# Patient Record
Sex: MALE | Race: WHITE | NOT HISPANIC OR LATINO
[De-identification: names, ages, dates, MRNs, and addresses within clinical notes are randomized per-mention and may not be internally consistent; named-entity substitution may affect disease eponyms.]

---

## 2017-06-22 PROBLEM — Z00.00 ENCOUNTER FOR PREVENTIVE HEALTH EXAMINATION: Status: ACTIVE | Noted: 2017-06-22

## 2017-06-30 ENCOUNTER — APPOINTMENT (OUTPATIENT)
Dept: CARDIOLOGY | Facility: CLINIC | Age: 52
End: 2017-06-30

## 2017-06-30 ENCOUNTER — NON-APPOINTMENT (OUTPATIENT)
Age: 52
End: 2017-06-30

## 2017-06-30 VITALS
WEIGHT: 187 LBS | DIASTOLIC BLOOD PRESSURE: 82 MMHG | HEART RATE: 68 BPM | RESPIRATION RATE: 16 BRPM | HEIGHT: 71 IN | BODY MASS INDEX: 26.18 KG/M2 | SYSTOLIC BLOOD PRESSURE: 123 MMHG | OXYGEN SATURATION: 99 %

## 2017-06-30 DIAGNOSIS — Z82.49 FAMILY HISTORY OF ISCHEMIC HEART DISEASE AND OTHER DISEASES OF THE CIRCULATORY SYSTEM: ICD-10-CM

## 2017-06-30 DIAGNOSIS — F90.9 ATTENTION-DEFICIT HYPERACTIVITY DISORDER, UNSPECIFIED TYPE: ICD-10-CM

## 2017-06-30 DIAGNOSIS — Z87.891 PERSONAL HISTORY OF NICOTINE DEPENDENCE: ICD-10-CM

## 2017-06-30 DIAGNOSIS — R94.31 ABNORMAL ELECTROCARDIOGRAM [ECG] [EKG]: ICD-10-CM

## 2017-06-30 RX ORDER — LISDEXAMFETAMINE DIMESYLATE 70 MG/1
70 CAPSULE ORAL
Qty: 90 | Refills: 0 | Status: ACTIVE | COMMUNITY
Start: 2017-04-27

## 2017-06-30 RX ORDER — MELOXICAM 15 MG/1
15 TABLET ORAL
Qty: 30 | Refills: 0 | Status: ACTIVE | COMMUNITY
Start: 2017-04-04

## 2017-06-30 RX ORDER — SODIUM SULFATE, POTASSIUM SULFATE, MAGNESIUM SULFATE 17.5; 3.13; 1.6 G/ML; G/ML; G/ML
17.5-3.13-1.6 SOLUTION, CONCENTRATE ORAL
Qty: 354 | Refills: 0 | Status: ACTIVE | COMMUNITY
Start: 2017-06-21

## 2017-08-18 ENCOUNTER — APPOINTMENT (OUTPATIENT)
Dept: CARDIOLOGY | Facility: CLINIC | Age: 52
End: 2017-08-18
Payer: COMMERCIAL

## 2017-08-18 PROCEDURE — 93015 CV STRESS TEST SUPVJ I&R: CPT

## 2017-08-18 PROCEDURE — 93306 TTE W/DOPPLER COMPLETE: CPT

## 2019-03-18 ENCOUNTER — INPATIENT (INPATIENT)
Facility: HOSPITAL | Age: 54
LOS: 3 days | Discharge: HOME | DRG: 558 | End: 2019-03-22
Attending: INTERNAL MEDICINE | Admitting: INTERNAL MEDICINE
Payer: COMMERCIAL

## 2019-03-18 VITALS
TEMPERATURE: 98 F | HEART RATE: 70 BPM | SYSTOLIC BLOOD PRESSURE: 126 MMHG | OXYGEN SATURATION: 100 % | WEIGHT: 188.05 LBS | DIASTOLIC BLOOD PRESSURE: 78 MMHG | RESPIRATION RATE: 16 BRPM

## 2019-03-18 DIAGNOSIS — M71.10 OTHER INFECTIVE BURSITIS, UNSPECIFIED SITE: ICD-10-CM

## 2019-03-18 LAB
ALBUMIN SERPL ELPH-MCNC: 4 G/DL — SIGNIFICANT CHANGE UP (ref 3.3–5)
ALP SERPL-CCNC: 67 U/L — SIGNIFICANT CHANGE UP (ref 40–120)
ALT FLD-CCNC: 17 U/L — SIGNIFICANT CHANGE UP (ref 10–45)
ANION GAP SERPL CALC-SCNC: 10 MMOL/L — SIGNIFICANT CHANGE UP (ref 5–17)
AST SERPL-CCNC: 21 U/L — SIGNIFICANT CHANGE UP (ref 10–40)
BASOPHILS # BLD AUTO: 0 K/UL — SIGNIFICANT CHANGE UP (ref 0–0.2)
BASOPHILS NFR BLD AUTO: 0.6 % — SIGNIFICANT CHANGE UP (ref 0–2)
BILIRUB SERPL-MCNC: 0.3 MG/DL — SIGNIFICANT CHANGE UP (ref 0.2–1.2)
BUN SERPL-MCNC: 15 MG/DL — SIGNIFICANT CHANGE UP (ref 7–23)
CALCIUM SERPL-MCNC: 9.9 MG/DL — SIGNIFICANT CHANGE UP (ref 8.4–10.5)
CHLORIDE SERPL-SCNC: 100 MMOL/L — SIGNIFICANT CHANGE UP (ref 96–108)
CO2 SERPL-SCNC: 28 MMOL/L — SIGNIFICANT CHANGE UP (ref 22–31)
CREAT SERPL-MCNC: 1.01 MG/DL — SIGNIFICANT CHANGE UP (ref 0.5–1.3)
CRP SERPL-MCNC: 1 MG/DL — HIGH (ref 0–0.4)
EOSINOPHIL # BLD AUTO: 0.3 K/UL — SIGNIFICANT CHANGE UP (ref 0–0.5)
EOSINOPHIL NFR BLD AUTO: 5.3 % — SIGNIFICANT CHANGE UP (ref 0–6)
ERYTHROCYTE [SEDIMENTATION RATE] IN BLOOD: 27 MM/HR — HIGH (ref 0–20)
GAS PNL BLDV: SIGNIFICANT CHANGE UP
GLUCOSE SERPL-MCNC: 99 MG/DL — SIGNIFICANT CHANGE UP (ref 70–99)
HCT VFR BLD CALC: 43.5 % — SIGNIFICANT CHANGE UP (ref 39–50)
HGB BLD-MCNC: 14.4 G/DL — SIGNIFICANT CHANGE UP (ref 13–17)
LYMPHOCYTES # BLD AUTO: 1.1 K/UL — SIGNIFICANT CHANGE UP (ref 1–3.3)
LYMPHOCYTES # BLD AUTO: 22.3 % — SIGNIFICANT CHANGE UP (ref 13–44)
MCHC RBC-ENTMCNC: 30.2 PG — SIGNIFICANT CHANGE UP (ref 27–34)
MCHC RBC-ENTMCNC: 33.2 GM/DL — SIGNIFICANT CHANGE UP (ref 32–36)
MCV RBC AUTO: 91.1 FL — SIGNIFICANT CHANGE UP (ref 80–100)
MONOCYTES # BLD AUTO: 0.5 K/UL — SIGNIFICANT CHANGE UP (ref 0–0.9)
MONOCYTES NFR BLD AUTO: 9.6 % — SIGNIFICANT CHANGE UP (ref 2–14)
NEUTROPHILS # BLD AUTO: 3.1 K/UL — SIGNIFICANT CHANGE UP (ref 1.8–7.4)
NEUTROPHILS NFR BLD AUTO: 62.2 % — SIGNIFICANT CHANGE UP (ref 43–77)
PLATELET # BLD AUTO: 192 K/UL — SIGNIFICANT CHANGE UP (ref 150–400)
POTASSIUM SERPL-MCNC: 5.2 MMOL/L — SIGNIFICANT CHANGE UP (ref 3.5–5.3)
POTASSIUM SERPL-SCNC: 5.2 MMOL/L — SIGNIFICANT CHANGE UP (ref 3.5–5.3)
PROT SERPL-MCNC: 7.4 G/DL — SIGNIFICANT CHANGE UP (ref 6–8.3)
RBC # BLD: 4.77 M/UL — SIGNIFICANT CHANGE UP (ref 4.2–5.8)
RBC # FLD: 11.7 % — SIGNIFICANT CHANGE UP (ref 10.3–14.5)
SODIUM SERPL-SCNC: 138 MMOL/L — SIGNIFICANT CHANGE UP (ref 135–145)
WBC # BLD: 5 K/UL — SIGNIFICANT CHANGE UP (ref 3.8–10.5)
WBC # FLD AUTO: 5 K/UL — SIGNIFICANT CHANGE UP (ref 3.8–10.5)

## 2019-03-18 PROCEDURE — 73562 X-RAY EXAM OF KNEE 3: CPT | Mod: 26,RT

## 2019-03-18 PROCEDURE — 99285 EMERGENCY DEPT VISIT HI MDM: CPT

## 2019-03-18 PROCEDURE — 73564 X-RAY EXAM KNEE 4 OR MORE: CPT | Mod: 26,RT

## 2019-03-18 PROCEDURE — 99254 IP/OBS CNSLTJ NEW/EST MOD 60: CPT | Mod: GC

## 2019-03-18 RX ORDER — VANCOMYCIN HCL 1 G
1250 VIAL (EA) INTRAVENOUS EVERY 12 HOURS
Qty: 0 | Refills: 0 | Status: DISCONTINUED | OUTPATIENT
Start: 2019-03-18 | End: 2019-03-20

## 2019-03-18 RX ORDER — KETOROLAC TROMETHAMINE 30 MG/ML
15 SYRINGE (ML) INJECTION ONCE
Qty: 0 | Refills: 0 | Status: DISCONTINUED | OUTPATIENT
Start: 2019-03-18 | End: 2019-03-18

## 2019-03-18 RX ORDER — LISDEXAMFETAMINE DIMESYLATE 70 MG/1
1 CAPSULE ORAL
Qty: 0 | Refills: 0 | COMMUNITY

## 2019-03-18 RX ORDER — SODIUM CHLORIDE 9 MG/ML
1000 INJECTION INTRAMUSCULAR; INTRAVENOUS; SUBCUTANEOUS ONCE
Qty: 0 | Refills: 0 | Status: COMPLETED | OUTPATIENT
Start: 2019-03-18 | End: 2019-03-18

## 2019-03-18 RX ADMIN — Medication 166.67 MILLIGRAM(S): at 18:56

## 2019-03-18 RX ADMIN — Medication 600 MILLIGRAM(S): at 14:00

## 2019-03-18 RX ADMIN — Medication 100 MILLIGRAM(S): at 13:23

## 2019-03-18 RX ADMIN — SODIUM CHLORIDE 2000 MILLILITER(S): 9 INJECTION INTRAMUSCULAR; INTRAVENOUS; SUBCUTANEOUS at 12:14

## 2019-03-18 RX ADMIN — Medication 15 MILLIGRAM(S): at 12:13

## 2019-03-18 NOTE — ED PROVIDER NOTE - ATTENDING CONTRIBUTION TO CARE
I performed a history and physical exam of the patient and discussed their management with the resident. I reviewed the resident's note and agree with the documented findings and plan of care.  Noni Merrill MD

## 2019-03-18 NOTE — CONSULT NOTE ADULT - ASSESSMENT
55 yo M no PMHx p/w R knee pain/swelling.  Found to have MRSA in the knee in the setting of known recurrent bursitis.      PENDING ATTENDING ATTESTATION    - 3/12 the patient returned to [facility] where a R knee bursal aspiration grew MRSA. He was started on bactrim and returned for follow up earlier today. The knee swelling was noted to have worsened  - c/w abx  - f/u blood cx, esr/crp  - Xray knee pending 55 yo M no PMHx p/w R knee pain/swelling.  Found to have MRSA in the knee in the setting of known recurrent bursitis.      PENDING ATTENDING ATTESTATION    - Pt reports pain/erythema started in November, due to worsening he got 1 week of abx in December with drainage and then had improvement however never complete resolution.  In January he got a second course of abx with improvement but no resolution again.  Then on 3/12 the patient returned to [facility] where a R knee bursal aspiration grew MRSA. He was started on bactrim and returned for follow up where he was told to go to the ER  - Xray knee consistent with prepatellar bursitis  - c/w abx  - f/u blood cx, esr/crp  - f/u with [facility] to get culture results to ensure that there is no other growth and to get sensitivities 53 yo M no PMHx p/w R knee pain/swelling.  Found to have MRSA in the knee in the setting of known recurrent bursitis.      PENDING ATTENDING ATTESTATION    - Pt reports pain/erythema started in November, due to worsening he got 1 week of abx in December with drainage and then had improvement however never complete resolution.  In January he got a second course of abx with improvement but no resolution again.  Then on 3/12 the patient returned to [facility] where a R knee bursal aspiration grew MRSA. He was started on bactrim and returned for follow up where he was told to go to the ER  - Xray knee consistent with prepatellar bursitis  - c/w vanco, per outpt Ortho the MRSA was sensitive to bactrim which the pt was on for a week and also sensitive to vanco  - f/u blood cx, esr/crp 53 yo M no PMHx p/w R knee pain/swelling.  Found to have MRSA in the knee in the setting of known recurrent bursitis.      PENDING ATTENDING ATTESTATION    - Pt reports pain/erythema started in November, due to worsening he got 1 week of abx in December with drainage and then had improvement however never complete resolution.  In January he got a second course of abx with improvement but no resolution again.  Then on 3/12 the patient returned to [facility] where a R knee bursal aspiration grew MRSA. He was started on bactrim and returned for follow up where he was told to go to the ER  - Xray knee consistent with prepatellar bursitis  - c/w vanco based on MRSA sensitivities  - f/u blood cx, esr/crp

## 2019-03-18 NOTE — H&P ADULT - EXTREMITIES COMMENTS
right knee with swelling anterior to the pattella with erythema and warmth.  no joint swelling. FROM

## 2019-03-18 NOTE — ED PROVIDER NOTE - NS ED ROS FT
GENERAL: No fever, +chills, EYES: no change in vision, HEENT: no trouble swallowing or speaking, CARDIAC: no chest pain, PULMONARY: no cough or SOB, GI: no abdominal pain, no nausea, no vomiting, no diarrhea or constipation, : No changes in urination, SKIN: no rashes, NEURO: no headache,  MSK: R knee pain ~Lynette Swain M.D. Resident

## 2019-03-18 NOTE — ED PROVIDER NOTE - PHYSICAL EXAMINATION
Gen: AAOx3, non-toxic  Head: NCAT  HEENT: EOMI, oral mucosa moist, normal conjunctiva  Lung: CTAB, no respiratory distress, no wheezes/rhonchi/rales B/L, speaking in full sentences  CV: RRR, no murmurs, rubs or gallops  Abd: soft, NTND, no guarding  MSK: R knee with erythema and swelling overlying patella, full knee ROM, pt ambulating with antalgic gait  Neuro: No focal sensory or motor deficits  Skin: Warm, well perfused, no rash, +erythema overlying R patella  Psych: normal affect.   ~Lynette Swain M.D. Resident

## 2019-03-18 NOTE — CONSULT NOTE ADULT - SUBJECTIVE AND OBJECTIVE BOX
Consultation Requested by:    Patient is a 54y old  Male who presents with a chief complaint of right knee pain (18 Mar 2019 14:25)    HPI:  55 yo M no PMHx p/w R knee pain/swelling. Pt initially had a bursitis drained from the right knee in early December. It became infected and he was started on Keflex. The knee continued to be red and swollen and on 3/12 it was drained again and grew MRSA. He was started on Bactrim last Tuesday and today followed up with Dr. Gaytan (orthopedics) and was told to go to the ER. He has been ambulating but not been bearing much weight on the right leg. He has had some chills yesterday, no fevers, N/V, abd pain. He had an MRI on 2/6. (18 Mar 2019 14:25)      REVIEW OF SYSTEMS      Allergies  No Known Allergies      FAMILY HISTORY:  No pertinent family history in first degree relatives    PAST MEDICAL & SURGICAL HISTORY:  No pertinent past medical history  No significant past surgical history    SOCIAL HISTORY:        Vital Signs Last 24 Hrs  T(C): 36.8 (18 Mar 2019 11:03), Max: 36.8 (18 Mar 2019 11:03)  T(F): 98.2 (18 Mar 2019 11:03), Max: 98.2 (18 Mar 2019 11:03)  HR: 70 (18 Mar 2019 11:03) (70 - 70)  BP: 126/78 (18 Mar 2019 11:03) (126/78 - 126/78)  BP(mean): --  RR: 16 (18 Mar 2019 11:03) (16 - 16)  SpO2: 100% (18 Mar 2019 11:03) (100% - 100%)    PHYSICAL EXAM  CONSTITUTIONAL: well appearing, well developed, no apparent distress  HEAD: Head atraumatic, normal cephalic shape.  EYES: sclera clear bilaterally, EOMI  CARDIAC: RRR, normal S1/S2, no murmurs  RESPIRATORY: no respiratory distress, normal breath sounds  CHEST: no erythema, warmth, tenderness or crepitus  GASTROINTESTINAL: soft, nontender, bowel sounds present  MUSCULOSKELETAL: +erythema/warmth of knee  +swelling of prepatellar area  +pain on direct palpation  NEUROLOGICAL: AAOx3, no focal deficits, full strength   SKIN: normal skin color, no apparent rashes  PSYCH: normal mood/affect    Lab Results:                        14.4   5.0   )-----------( 192      ( 18 Mar 2019 12:16 )             43.5     03-18    138  |  100  |  15  ----------------------------<  99  5.2   |  28  |  1.01    Ca    9.9      18 Mar 2019 12:16    TPro  7.4  /  Alb  4.0  /  TBili  0.3  /  DBili  x   /  AST  21  /  ALT  17  /  AlkPhos  67  03-18    LIVER FUNCTIONS - ( 18 Mar 2019 12:16 )  Alb: 4.0 g/dL / Pro: 7.4 g/dL / ALK PHOS: 67 U/L / ALT: 17 U/L / AST: 21 U/L / GGT: x Patient is a 54y old  Male who presents with a chief complaint of right knee pain (18 Mar 2019 14:25)    HPI:  55 yo M no PMHx p/w R knee pain/swelling. Pt initially had a bursitis drained from the right knee in early December. It became infected and he was started on Keflex. The knee continued to be red and swollen and on 3/12 it was drained again and grew MRSA. He was started on Bactrim last Tuesday and today followed up with Dr. Gaytan (orthopedics) and was told to go to the ER. He has been ambulating but not been bearing much weight on the right leg. He has had some chills yesterday, no fevers, N/V, abd pain. He had an MRI on 2/6. (18 Mar 2019 14:25)      REVIEW OF SYSTEMS  CONSTITUTIONAL: No weakness, fevers or chills  HEENT: No visual changes; No vertigo or throat pain   NECK: No pain or stiffness  RESPIRATORY: No cough, wheezing, hemoptysis; No shortness of breath  CARDIOVASCULAR: No chest pain or palpitations  GASTROINTESTINAL: no abdominal discomfort, No nausea, vomiting, or hematemesis; No diarrhea or constipation. No melena or hematochezia.  GENITOURINARY: No dysuria, frequency or hematuria  NEUROLOGICAL: No numbness or weakness  MSK: R knee tenderness  SKIN: No itching, no rash      Allergies  No Known Allergies      FAMILY HISTORY:  No pertinent family history in first degree relatives    PAST MEDICAL & SURGICAL HISTORY:  No pertinent past medical history  No significant past surgical history        Vital Signs Last 24 Hrs  T(C): 36.8 (18 Mar 2019 11:03), Max: 36.8 (18 Mar 2019 11:03)  T(F): 98.2 (18 Mar 2019 11:03), Max: 98.2 (18 Mar 2019 11:03)  HR: 70 (18 Mar 2019 11:03) (70 - 70)  BP: 126/78 (18 Mar 2019 11:03) (126/78 - 126/78)  BP(mean): --  RR: 16 (18 Mar 2019 11:03) (16 - 16)  SpO2: 100% (18 Mar 2019 11:03) (100% - 100%)    PHYSICAL EXAM  CONSTITUTIONAL: well appearing, well developed, no apparent distress  HEAD: Head atraumatic, normal cephalic shape.  EYES: sclera clear bilaterally, EOMI  CARDIAC: RRR, normal S1/S2, no murmurs  RESPIRATORY: no respiratory distress, normal breath sounds  CHEST: no erythema, warmth, tenderness or crepitus  GASTROINTESTINAL: soft, nontender, bowel sounds present  MUSCULOSKELETAL: +erythema/warmth of knee, +swelling of prepatellar area, +pain on direct palpation, +limited ROM in setting of pain  NEUROLOGICAL: AAOx3, no focal deficits, full strength   SKIN: normal skin color, no apparent rashes  PSYCH: normal mood/affect    Lab Results:                        14.4   5.0   )-----------( 192      ( 18 Mar 2019 12:16 )             43.5     03-18    138  |  100  |  15  ----------------------------<  99  5.2   |  28  |  1.01    Ca    9.9      18 Mar 2019 12:16    TPro  7.4  /  Alb  4.0  /  TBili  0.3  /  DBili  x   /  AST  21  /  ALT  17  /  AlkPhos  67  03-18    LIVER FUNCTIONS - ( 18 Mar 2019 12:16 )  Alb: 4.0 g/dL / Pro: 7.4 g/dL / ALK PHOS: 67 U/L / ALT: 17 U/L / AST: 21 U/L / GGT: x Patient is a 54y old  Male who presents with a chief complaint of right knee pain (18 Mar 2019 14:25)    HPI:  53 yo M no PMHx p/w R knee pain/swelling. Pt initially had a bursitis drained from the right knee in early December. It became infected and he was started on Keflex. The knee continued to be red and swollen and on 3/12 it was drained again and grew MRSA. He was started on Bactrim last Tuesday and today followed up with Dr. Gaytan (orthopedics) and was told to go to the ER. He has been ambulating but not been bearing much weight on the right leg. He has had some chills yesterday, no fevers, N/V, abd pain. He had an MRI on 2/6. (18 Mar 2019 14:25)      REVIEW OF SYSTEMS  CONSTITUTIONAL: No weakness, fevers or chills  HEENT: No visual changes; No vertigo or throat pain   NECK: No pain or stiffness  RESPIRATORY: No cough, wheezing, hemoptysis; No shortness of breath  CARDIOVASCULAR: No chest pain or palpitations  GASTROINTESTINAL: no abdominal discomfort, No nausea, vomiting, or hematemesis; No diarrhea or constipation. No melena or hematochezia.  GENITOURINARY: No dysuria, frequency or hematuria  NEUROLOGICAL: No numbness or weakness  MSK: R knee tenderness  SKIN: No itching, no rash      Allergies  No Known Allergies      FAMILY HISTORY:  No pertinent family history in first degree relatives    PAST MEDICAL & SURGICAL HISTORY:  No pertinent past medical history  No significant past surgical history        Vital Signs Last 24 Hrs  T(C): 36.8 (18 Mar 2019 11:03), Max: 36.8 (18 Mar 2019 11:03)  T(F): 98.2 (18 Mar 2019 11:03), Max: 98.2 (18 Mar 2019 11:03)  HR: 70 (18 Mar 2019 11:03) (70 - 70)  BP: 126/78 (18 Mar 2019 11:03) (126/78 - 126/78)  BP(mean): --  RR: 16 (18 Mar 2019 11:03) (16 - 16)  SpO2: 100% (18 Mar 2019 11:03) (100% - 100%)    PHYSICAL EXAM  CONSTITUTIONAL: well appearing, well developed, no apparent distress  HEAD: Head atraumatic, normal cephalic shape.  EYES: sclera clear bilaterally, EOMI  CARDIAC: RRR, normal S1/S2, no murmurs  RESPIRATORY: no respiratory distress, normal breath sounds  CHEST: no erythema, warmth, tenderness or crepitus  GASTROINTESTINAL: soft, nontender, bowel sounds present  MUSCULOSKELETAL: +erythema/warmth of knee, +swelling of prepatellar area, +pain on direct palpation, +limited ROM in setting of pain  NEUROLOGICAL: AAOx3, no focal deficits, full strength   SKIN: normal skin color, no apparent rashes  PSYCH: normal mood/affect    Lab Results:                        14.4   5.0   )-----------( 192      ( 18 Mar 2019 12:16 )             43.5     03-18    138  |  100  |  15  ----------------------------<  99  5.2   |  28  |  1.01    Ca    9.9      18 Mar 2019 12:16    TPro  7.4  /  Alb  4.0  /  TBili  0.3  /  DBili  x   /  AST  21  /  ALT  17  /  AlkPhos  67  03-18    LIVER FUNCTIONS - ( 18 Mar 2019 12:16 )  Alb: 4.0 g/dL / Pro: 7.4 g/dL / ALK PHOS: 67 U/L / ALT: 17 U/L / AST: 21 U/L / GGT: x             < from: Xray Knee 4 Views, Right (03.18.19 @ 12:23) >  Anterior soft tissue swelling, consistent with prepatellar bursitis.    < end of copied text >

## 2019-03-18 NOTE — ED PROVIDER NOTE - CLINICAL SUMMARY MEDICAL DECISION MAKING FREE TEXT BOX
55 yo M no PMHx p/w R knee pain/swelling s/p arthrocentesis x 2 with growth of MRSA on 3/12, likely septic bursitis as pt with full knee ROM and minimal pain, will d/w ortho, check labs, Xray, pain meds, IV abx, treat/dispo accordingly

## 2019-03-18 NOTE — CONSULT NOTE ADULT - ASSESSMENT
A/P: 54yoM with R knee septic bursitis with MRSA + cultures    Neuro: Pain control  Resp: IS  GI: Regular diet, bowel reg  MSK: WBAT, PT/OT  Heme: DVT PPX  ID: IV abx per sensitivities from prior culture  FU ESR/CRP  FU Blood cultures  No acute ortho intervention at this time  Will continue to follow  Discussed with attending     x1333 A/P: 54yoM with R knee septic bursitis with MRSA + R knee bursa cultures    Neuro: Pain control  Resp: IS  GI: Regular diet, bowel reg  MSK: WBAT, PT/OT  Heme: DVT PPX  ID: IV abx per sensitivities from prior culture  FU ESR/CRP  FU Blood cultures  No acute ortho intervention at this time  Will continue to follow  Discussed with attending     x1337 A/P: 54yoM with R knee prepatellar bursitis with MRSA + R knee bursa cultures    Neuro: Pain control  Resp: IS  GI: Regular diet, bowel reg  MSK: WBAT, PT/OT  Heme: DVT PPX  ID: IV abx per sensitivities from prior culture  FU ESR/CRP  FU Blood cultures  No acute ortho intervention at this time  Will continue to follow  Discussed with attending     [id number]

## 2019-03-18 NOTE — CONSULT NOTE ADULT - SUBJECTIVE AND OBJECTIVE BOX
54yMale c/o R knee pain/redness/swelling that started 12/18. The Patient went to [facility] where his knee was aspirated and a steroid was injected. The swelling initially went down but then began to worsen until it involved most of his lower leg. The patient was started on keflex 1/19 and the redness/swelling began to improve. However, he stopped taking it a few days early and the symptoms returned. Last week, 3/12 the patient returned to [facility] where a knee aspiration grew MRSA. He was started on bactrim and returned for follow up earlier today. The knee swelling was noted to have worsened so he was told to come to the ED for IV abx. The patient denies hx of trauma. Patient denies fever but endorses some chills.     ROS: 10 point review of systems otherwise negative unless noted in HPI      PMHx: none    PSH: b/l carpal tunnel 15yrs ago    AH: none    Meds: See med rec    T(C): 36.8 (03-18-19 @ 11:03)  HR: 70 (03-18-19 @ 11:03)  BP: 126/78 (03-18-19 @ 11:03)  RR: 16 (03-18-19 @ 11:03)  SpO2: 100% (03-18-19 @ 11:03)  Wt(kg): --    Gen: NAD  Resp: Unlabored breathing  PE RLE:  Skin intact   +erythema/warmth of knee  +swelling of prepatellar area  +pain on direct palpation  SILT DP/SP/Duy/Saph  +EHL/FHL/TA/Gastroc,   Knee extension to 0  Knee flexion to 130, mildly painful  Able to bear weight   DP+/PT + pulse  soft compartments  No calf ttp.      Imaging:  XR of R knee demonstrating no acute fracture or dislocation                          14.4   5.0   )-----------( 192      ( 18 Mar 2019 12:16 )             43.5   03-18    138  |  100  |  15  ----------------------------<  99  5.2   |  28  |  1.01    Ca    9.9      18 Mar 2019 12:16    TPro  7.4  /  Alb  4.0  /  TBili  0.3  /  DBili  x   /  AST  21  /  ALT  17  /  AlkPhos  67  03-18 54yMale c/o R knee pain/redness/swelling that started 12/18. The Patient went to [facility] where his knee was aspirated and a steroid was injected. The swelling initially went down but then began to worsen until it involved most of his lower leg. The patient was started on keflex 1/19 and the redness/swelling began to improve. However, he stopped taking it a few days early and the symptoms returned. Last week, 3/12 the patient returned to [facility] where a R knee bursal aspiration grew MRSA. He was started on bactrim and returned for follow up earlier today. The knee swelling was noted to have worsened so he was told to come to the ED for IV abx. The patient denies hx of trauma. Patient denies fever but endorses some chills.     ROS: 10 point review of systems otherwise negative unless noted in HPI      PMHx: none    PSH: b/l carpal tunnel 15yrs ago    AH: none    Meds: See med rec    T(C): 36.8 (03-18-19 @ 11:03)  HR: 70 (03-18-19 @ 11:03)  BP: 126/78 (03-18-19 @ 11:03)  RR: 16 (03-18-19 @ 11:03)  SpO2: 100% (03-18-19 @ 11:03)  Wt(kg): --    Gen: NAD  Resp: Unlabored breathing  PE RLE:  Skin intact   +erythema/warmth of knee  +swelling of prepatellar area  +pain on direct palpation  SILT DP/SP/Duy/Saph  +EHL/FHL/TA/Gastroc,   Knee extension to 0  Knee flexion to 130, mildly painful  Able to bear weight   DP+/PT + pulse  soft compartments  No calf ttp.      Imaging:  XR of R knee demonstrating no acute fracture or dislocation                          14.4   5.0   )-----------( 192      ( 18 Mar 2019 12:16 )             43.5   03-18    138  |  100  |  15  ----------------------------<  99  5.2   |  28  |  1.01    Ca    9.9      18 Mar 2019 12:16    TPro  7.4  /  Alb  4.0  /  TBili  0.3  /  DBili  x   /  AST  21  /  ALT  17  /  AlkPhos  67  03-18

## 2019-03-18 NOTE — H&P ADULT - ASSESSMENT
55 yo M no PMHx p/w R knee pain/swelling. Pt initially had a bursitis drained from the right knee in early December. It became infected and he was started on Keflex. The knee continued to be red and swollen and on 3/12 it was drained again and grew MRSA. He was started on Bactrim last Tuesday and today followed up with Dr. Gaytan (orthopedics) and was told to go to the ER. He has been ambulating but not been bearing much weight on the right leg. He has had some chills yesterday, no fevers, N/V, abd pain. He had an MRI on 2/6.     R knee prepatellar bursitis with MRSA + R knee bursa cultures    Neuro: Pain control  Resp: IS  GI: Regular diet, bowel reg  MSK: WBAT, PT/OT  Heme: DVT PPX  ID consult: IV abx per sensitivities from prior culture  FU ESR/CRP  FU Blood cultures  seen by ortho  No acute ortho intervention at this time

## 2019-03-18 NOTE — H&P ADULT - HISTORY OF PRESENT ILLNESS
53 yo M no PMHx p/w R knee pain/swelling. Pt initially had a bursitis drained from the right knee in early December. It became infected and he was started on Keflex. The knee continued to be red and swollen and on 3/12 it was drained again and grew MRSA. He was started on Bactrim last Tuesday and today followed up with Dr. Gaytan (orthopedics) and was told to go to the ER. He has been ambulating but not been bearing much weight on the right leg. He has had some chills yesterday, no fevers, N/V, abd pain. He had an MRI on 2/6.

## 2019-03-18 NOTE — CONSULT NOTE ADULT - ATTENDING COMMENTS
Continue IV Abx, will continue follow, if condition worsens, will plan for I+D however at this point, no surgical intervention indicated.
54M with MRSA bursitis with recent abx use  -no fever  -knee joint does not look involved  -vancomycin 1.25 gm iv q12  -f/u blood cx  -ortho input noted  -if not improving, consider I+D   -not septic    Rafat Ghotra  Attending Physician   Division of Infectious Disease  Pager #[phone]  After 5pm/weekend or no response, call #[phone]

## 2019-03-18 NOTE — ED ADULT NURSE NOTE - OBJECTIVE STATEMENT
55 y/o male with PMH ADD presenting to ED for right knee reddness & pain. Pt states "I have bursitis and have been having my knee drained but it's still swollen and red. I had an MRI last month and a sonogram this past tuesday. The redness was down my calf but has been getting better. I'm currently on antibiotics for it. " Upon exam pt A&Ox3 gross neuro intact, lungs cta bilaterally, no difficulty speaking in complete sentences, s1s2 heart sounds heard,  abdomen soft nontender nondistended, skin intact, right knee red, swollen, tender to touch, +ROM in bilateral lower extremities, 1+ pitting edema to right lower extremity, afebrile in ED, Pt denies chest pain, sob, ha, n/v/d, abdominal pain, f/c, urinary symptoms, hematuria

## 2019-03-19 DIAGNOSIS — A49.02 METHICILLIN RESISTANT STAPHYLOCOCCUS AUREUS INFECTION, UNSPECIFIED SITE: ICD-10-CM

## 2019-03-19 DIAGNOSIS — M71.10 OTHER INFECTIVE BURSITIS, UNSPECIFIED SITE: ICD-10-CM

## 2019-03-19 PROCEDURE — 99232 SBSQ HOSP IP/OBS MODERATE 35: CPT

## 2019-03-19 RX ORDER — ACETAMINOPHEN 500 MG
650 TABLET ORAL EVERY 6 HOURS
Qty: 0 | Refills: 0 | Status: DISCONTINUED | OUTPATIENT
Start: 2019-03-19 | End: 2019-03-22

## 2019-03-19 RX ORDER — KETOROLAC TROMETHAMINE 30 MG/ML
15 SYRINGE (ML) INJECTION ONCE
Qty: 0 | Refills: 0 | Status: DISCONTINUED | OUTPATIENT
Start: 2019-03-19 | End: 2019-03-19

## 2019-03-19 RX ADMIN — Medication 15 MILLIGRAM(S): at 03:17

## 2019-03-19 RX ADMIN — Medication 166.67 MILLIGRAM(S): at 18:14

## 2019-03-19 RX ADMIN — Medication 650 MILLIGRAM(S): at 18:21

## 2019-03-19 RX ADMIN — Medication 15 MILLIGRAM(S): at 03:47

## 2019-03-19 RX ADMIN — Medication 166.67 MILLIGRAM(S): at 05:04

## 2019-03-19 NOTE — PROGRESS NOTE ADULT - SUBJECTIVE AND OBJECTIVE BOX
SCHUYLER YEE 54y MRN-[id number]    Patient is a 54y old  Male who presents with a chief complaint of right knee pain (18 Mar 2019 14:39)      Follow Up/CC:  ID following for bursitis    Interval History/ROS: still with knee pain, spontaneous drainage today with pus    Allergies    No Known Allergies    Intolerances        ANTIMICROBIALS:  vancomycin  IVPB 1250 every 12 hours      MEDICATIONS  (STANDING):  vancomycin  IVPB 1250 milliGRAM(s) IV Intermittent every 12 hours    MEDICATIONS  (PRN):  acetaminophen   Tablet .. 650 milliGRAM(s) Oral every 6 hours PRN Moderate Pain (4 - 6)        Vital Signs Last 24 Hrs  T(C): 37.1 (19 Mar 2019 17:54), Max: 37.3 (18 Mar 2019 22:00)  T(F): 98.8 (19 Mar 2019 17:54), Max: 99.1 (18 Mar 2019 22:00)  HR: 68 (19 Mar 2019 17:54) (61 - 69)  BP: 127/76 (19 Mar 2019 17:54) (113/70 - 127/76)  BP(mean): --  RR: 18 (19 Mar 2019 17:54) (17 - 18)  SpO2: 98% (19 Mar 2019 17:54) (96% - 98%)    CBC Full  -  ( 18 Mar 2019 12:16 )  WBC Count : 5.0 K/uL  Hemoglobin : 14.4 g/dL  Hematocrit : 43.5 %  Platelet Count - Automated : 192 K/uL  Mean Cell Volume : 91.1 fl  Mean Cell Hemoglobin : 30.2 pg  Mean Cell Hemoglobin Concentration : 33.2 gm/dL  Auto Neutrophil # : 3.1 K/uL  Auto Lymphocyte # : 1.1 K/uL  Auto Monocyte # : 0.5 K/uL  Auto Eosinophil # : 0.3 K/uL  Auto Basophil # : 0.0 K/uL  Auto Neutrophil % : 62.2 %  Auto Lymphocyte % : 22.3 %  Auto Monocyte % : 9.6 %  Auto Eosinophil % : 5.3 %  Auto Basophil % : 0.6 %    03-18    138  |  100  |  15  ----------------------------<  99  5.2   |  28  |  1.01    Ca    9.9      18 Mar 2019 12:16    TPro  7.4  /  Alb  4.0  /  TBili  0.3  /  DBili  x   /  AST  21  /  ALT  17  /  AlkPhos  67  03-18    LIVER FUNCTIONS - ( 18 Mar 2019 12:16 )  Alb: 4.0 g/dL / Pro: 7.4 g/dL / ALK PHOS: 67 U/L / ALT: 17 U/L / AST: 21 U/L / GGT: x               MICROBIOLOGY:    RADIOLOGY    Xray Knee 4 Views, Right (03.18.19 @ 12:23) >  Anterior soft tissue swelling, consistent with prepatellar bursitis.

## 2019-03-19 NOTE — PROGRESS NOTE ADULT - NSICDXPROBLEM_GEN_ALL_CORE_FT
PROBLEM DIAGNOSES  Problem: Septic bursitis  Assessment and Plan: -ortho reeval given pus drainage- ?need for further I+D    Problem: MRSA infection  Assessment and Plan: -cont vanomcyin  -wound cx sent off drainage

## 2019-03-19 NOTE — CHART NOTE - NSCHARTNOTEFT_GEN_A_CORE
Attending Dr. Spear saw patient today. Continue to allow bursa to drain and decompress naturally    No acute ortho intervention  Please page back as needed if you have questions    Ortho [id number]

## 2019-03-19 NOTE — PROGRESS NOTE ADULT - SUBJECTIVE AND OBJECTIVE BOX
Patient is a 54y old  Male who presents with a chief complaint of right knee pain (19 Mar 2019 12:16)      SUBJECTIVE / OVERNIGHT EVENTS:  oozing from right knee    MEDICATIONS  (STANDING):  vancomycin  IVPB 1250 milliGRAM(s) IV Intermittent every 12 hours    MEDICATIONS  (PRN):  acetaminophen   Tablet .. 650 milliGRAM(s) Oral every 6 hours PRN Moderate Pain (4 - 6)      Vital Signs Last 24 Hrs  T(C): 37.1 (19 Mar 2019 17:54), Max: 37.3 (18 Mar 2019 22:00)  T(F): 98.8 (19 Mar 2019 17:54), Max: 99.1 (18 Mar 2019 22:00)  HR: 68 (19 Mar 2019 17:54) (61 - 69)  BP: 127/76 (19 Mar 2019 17:54) (113/70 - 127/76)  BP(mean): --  RR: 18 (19 Mar 2019 17:54) (17 - 18)  SpO2: 98% (19 Mar 2019 17:54) (96% - 98%)  CAPILLARY BLOOD GLUCOSE        I&O's Summary    18 Mar 2019 07:01  -  19 Mar 2019 07:00  --------------------------------------------------------  IN: 240 mL / OUT: 0 mL / NET: 240 mL    19 Mar 2019 07:01  -  19 Mar 2019 20:03  --------------------------------------------------------  IN: 480 mL / OUT: 250 mL / NET: 230 mL        PHYSICAL EXAM:  GENERAL: NAD, well-developed  HEAD:  Atraumatic, Normocephalic  EYES: EOMI, PERRLA, conjunctiva and sclera clear  NECK: Supple, No JVD  CHEST/LUNG: Clear to auscultation bilaterally; No wheeze  HEART: Regular rate and rhythm; No murmurs, rubs, or gallops  ABDOMEN: Soft, Nontender, Nondistended; Bowel sounds present  EXTREMITIES:  2+ Peripheral Pulses, No clubbing, cyanosis, or edema, oozing pus from knee.  erythema with swelling.  PSYCH: AAOx3  NEUROLOGY: non-focal  SKIN: No rashes or lesions    LABS:                        14.4   5.0   )-----------( 192      ( 18 Mar 2019 12:16 )             43.5     03-18    138  |  100  |  15  ----------------------------<  99  5.2   |  28  |  1.01    Ca    9.9      18 Mar 2019 12:16    TPro  7.4  /  Alb  4.0  /  TBili  0.3  /  DBili  x   /  AST  21  /  ALT  17  /  AlkPhos  67  03-18              RADIOLOGY & ADDITIONAL TESTS:    Imaging Personally Reviewed:    Consultant(s) Notes Reviewed:      Care Discussed with Consultants/Other Providers: Patient is a 54y old  Male who presents with a chief complaint of right knee pain (19 Mar 2019 12:16)      SUBJECTIVE / OVERNIGHT EVENTS:  oozing pus from right knee    MEDICATIONS  (STANDING):  vancomycin  IVPB 1250 milliGRAM(s) IV Intermittent every 12 hours    MEDICATIONS  (PRN):  acetaminophen   Tablet .. 650 milliGRAM(s) Oral every 6 hours PRN Moderate Pain (4 - 6)      Vital Signs Last 24 Hrs  T(C): 37.1 (19 Mar 2019 17:54), Max: 37.3 (18 Mar 2019 22:00)  T(F): 98.8 (19 Mar 2019 17:54), Max: 99.1 (18 Mar 2019 22:00)  HR: 68 (19 Mar 2019 17:54) (61 - 69)  BP: 127/76 (19 Mar 2019 17:54) (113/70 - 127/76)  BP(mean): --  RR: 18 (19 Mar 2019 17:54) (17 - 18)  SpO2: 98% (19 Mar 2019 17:54) (96% - 98%)  CAPILLARY BLOOD GLUCOSE        I&O's Summary    18 Mar 2019 07:01  -  19 Mar 2019 07:00  --------------------------------------------------------  IN: 240 mL / OUT: 0 mL / NET: 240 mL    19 Mar 2019 07:01  -  19 Mar 2019 20:03  --------------------------------------------------------  IN: 480 mL / OUT: 250 mL / NET: 230 mL        PHYSICAL EXAM:  GENERAL: NAD, well-developed  HEAD:  Atraumatic, Normocephalic  EYES: EOMI, PERRLA, conjunctiva and sclera clear  NECK: Supple, No JVD  CHEST/LUNG: Clear to auscultation bilaterally; No wheeze  HEART: Regular rate and rhythm; No murmurs, rubs, or gallops  ABDOMEN: Soft, Nontender, Nondistended; Bowel sounds present  EXTREMITIES:  2+ Peripheral Pulses, No clubbing, cyanosis, or edema, oozing pus from knee.  erythema with swelling.  PSYCH: AAOx3  NEUROLOGY: non-focal  SKIN: No rashes or lesions    LABS:                        14.4   5.0   )-----------( 192      ( 18 Mar 2019 12:16 )             43.5     03-18    138  |  100  |  15  ----------------------------<  99  5.2   |  28  |  1.01    Ca    9.9      18 Mar 2019 12:16    TPro  7.4  /  Alb  4.0  /  TBili  0.3  /  DBili  x   /  AST  21  /  ALT  17  /  AlkPhos  67  03-18              RADIOLOGY & ADDITIONAL TESTS:    Imaging Personally Reviewed:    Consultant(s) Notes Reviewed:      Care Discussed with Consultants/Other Providers:

## 2019-03-20 DIAGNOSIS — Z51.81 ENCOUNTER FOR THERAPEUTIC DRUG LEVEL MONITORING: ICD-10-CM

## 2019-03-20 LAB — VANCOMYCIN TROUGH SERPL-MCNC: 9.6 UG/ML — LOW (ref 10–20)

## 2019-03-20 PROCEDURE — 99232 SBSQ HOSP IP/OBS MODERATE 35: CPT

## 2019-03-20 RX ORDER — VANCOMYCIN HCL 1 G
1250 VIAL (EA) INTRAVENOUS EVERY 8 HOURS
Qty: 0 | Refills: 0 | Status: DISCONTINUED | OUTPATIENT
Start: 2019-03-20 | End: 2019-03-22

## 2019-03-20 RX ORDER — VANCOMYCIN HCL 1 G
1500 VIAL (EA) INTRAVENOUS ONCE
Qty: 0 | Refills: 0 | Status: DISCONTINUED | OUTPATIENT
Start: 2019-03-20 | End: 2019-03-20

## 2019-03-20 RX ORDER — ENOXAPARIN SODIUM 100 MG/ML
40 INJECTION SUBCUTANEOUS DAILY
Qty: 0 | Refills: 0 | Status: DISCONTINUED | OUTPATIENT
Start: 2019-03-20 | End: 2019-03-22

## 2019-03-20 RX ADMIN — Medication 166.67 MILLIGRAM(S): at 21:47

## 2019-03-20 RX ADMIN — ENOXAPARIN SODIUM 40 MILLIGRAM(S): 100 INJECTION SUBCUTANEOUS at 21:47

## 2019-03-20 RX ADMIN — Medication 166.67 MILLIGRAM(S): at 06:37

## 2019-03-20 RX ADMIN — Medication 650 MILLIGRAM(S): at 05:52

## 2019-03-20 RX ADMIN — Medication 166.67 MILLIGRAM(S): at 13:16

## 2019-03-20 RX ADMIN — Medication 650 MILLIGRAM(S): at 05:22

## 2019-03-20 NOTE — PROGRESS NOTE ADULT - SUBJECTIVE AND OBJECTIVE BOX
Patient is a 54y old  Male who presents with a chief complaint of right knee pain (20 Mar 2019 09:47)      SUBJECTIVE / OVERNIGHT EVENTS:  No chest pain. No shortness of breath. No complaints. No events overnight.     MEDICATIONS  (STANDING):  vancomycin  IVPB 1250 milliGRAM(s) IV Intermittent every 8 hours    MEDICATIONS  (PRN):  acetaminophen   Tablet .. 650 milliGRAM(s) Oral every 6 hours PRN Moderate Pain (4 - 6)      Vital Signs Last 24 Hrs  T(C): 36.8 (20 Mar 2019 10:50), Max: 37.1 (19 Mar 2019 17:54)  T(F): 98.3 (20 Mar 2019 10:50), Max: 98.8 (19 Mar 2019 17:54)  HR: 61 (20 Mar 2019 10:50) (61 - 68)  BP: 114/72 (20 Mar 2019 10:50) (114/72 - 127/76)  BP(mean): --  RR: 18 (20 Mar 2019 10:50) (18 - 18)  SpO2: 98% (20 Mar 2019 10:50) (96% - 98%)  CAPILLARY BLOOD GLUCOSE        I&O's Summary    19 Mar 2019 07:01  -  20 Mar 2019 07:00  --------------------------------------------------------  IN: 1100 mL / OUT: 250 mL / NET: 850 mL    20 Mar 2019 07:01  -  20 Mar 2019 16:23  --------------------------------------------------------  IN: 370 mL / OUT: 0 mL / NET: 370 mL        PHYSICAL EXAM:  GENERAL: NAD, well-developed  HEAD:  Atraumatic, Normocephalic  EYES: EOMI, PERRLA, conjunctiva and sclera clear  NECK: Supple, No JVD  CHEST/LUNG: Clear to auscultation bilaterally; No wheeze  HEART: Regular rate and rhythm; No murmurs, rubs, or gallops  ABDOMEN: Soft, Nontender, Nondistended; Bowel sounds present  EXTREMITIES:  2+ Peripheral Pulses, No clubbing, cyanosis, or edema, improved swelling  PSYCH: AAOx3  NEUROLOGY: non-focal  SKIN: No rashes or lesions    LABS:                    RADIOLOGY & ADDITIONAL TESTS:    Imaging Personally Reviewed:    Consultant(s) Notes Reviewed:      Care Discussed with Consultants/Other Providers:

## 2019-03-20 NOTE — PROGRESS NOTE ADULT - SUBJECTIVE AND OBJECTIVE BOX
SCHUYLER YEE 54y [id number]    Patient is a 54y old  Male who presents with a chief complaint of right knee pain (19 Mar 2019 20:03)      Follow Up/CC:  ID following for bursitis    Interval History/ROS: knee feels better, still draining, no fever    Allergies    No Known Allergies    Intolerances        ANTIMICROBIALS:  vancomycin  IVPB 1250 every 8 hours      MEDICATIONS  (STANDING):  vancomycin  IVPB 1250 milliGRAM(s) IV Intermittent every 8 hours    MEDICATIONS  (PRN):  acetaminophen   Tablet .. 650 milliGRAM(s) Oral every 6 hours PRN Moderate Pain (4 - 6)        Vital Signs Last 24 Hrs  T(C): 36.7 (20 Mar 2019 05:20), Max: 37.1 (19 Mar 2019 17:54)  T(F): 98 (20 Mar 2019 05:20), Max: 98.8 (19 Mar 2019 17:54)  HR: 62 (20 Mar 2019 05:20) (62 - 68)  BP: 118/74 (20 Mar 2019 05:20) (116/71 - 127/76)  BP(mean): --  RR: 18 (20 Mar 2019 05:20) (18 - 18)  SpO2: 97% (20 Mar 2019 05:20) (96% - 98%)    CBC Full  -  ( 18 Mar 2019 12:16 )  WBC Count : 5.0 K/uL  Hemoglobin : 14.4 g/dL  Hematocrit : 43.5 %  Platelet Count - Automated : 192 K/uL  Mean Cell Volume : 91.1 fl  Mean Cell Hemoglobin : 30.2 pg  Mean Cell Hemoglobin Concentration : 33.2 gm/dL  Auto Neutrophil # : 3.1 K/uL  Auto Lymphocyte # : 1.1 K/uL  Auto Monocyte # : 0.5 K/uL  Auto Eosinophil # : 0.3 K/uL  Auto Basophil # : 0.0 K/uL  Auto Neutrophil % : 62.2 %  Auto Lymphocyte % : 22.3 %  Auto Monocyte % : 9.6 %  Auto Eosinophil % : 5.3 %  Auto Basophil % : 0.6 %    03-18    138  |  100  |  15  ----------------------------<  99  5.2   |  28  |  1.01    Ca    9.9      18 Mar 2019 12:16    TPro  7.4  /  Alb  4.0  /  TBili  0.3  /  DBili  x   /  AST  21  /  ALT  17  /  AlkPhos  67  03-18    LIVER FUNCTIONS - ( 18 Mar 2019 12:16 )  Alb: 4.0 g/dL / Pro: 7.4 g/dL / ALK PHOS: 67 U/L / ALT: 17 U/L / AST: 21 U/L / GGT: x               MICROBIOLOGY:  .Blood Blood-Venous  03-18-19   No growth to date.  --  --      Vancomycin Level, Trough: 9.6 ug/mL (03-20-19 @ 04:56)    RADIOLOGY    < from: Xray Knee 4 Views, Right (03.18.19 @ 12:23) >  Anterior soft tissue swelling, consistent with prepatellar bursitis.    < end of copied text >

## 2019-03-21 LAB
-  AMPICILLIN/SULBACTAM: SIGNIFICANT CHANGE UP
-  CEFAZOLIN: SIGNIFICANT CHANGE UP
-  CLINDAMYCIN: SIGNIFICANT CHANGE UP
-  DAPTOMYCIN: SIGNIFICANT CHANGE UP
-  ERYTHROMYCIN: SIGNIFICANT CHANGE UP
-  GENTAMICIN: SIGNIFICANT CHANGE UP
-  LINEZOLID: SIGNIFICANT CHANGE UP
-  OXACILLIN: SIGNIFICANT CHANGE UP
-  PENICILLIN: SIGNIFICANT CHANGE UP
-  RIFAMPIN: SIGNIFICANT CHANGE UP
-  TETRACYCLINE: SIGNIFICANT CHANGE UP
-  TRIMETHOPRIM/SULFAMETHOXAZOLE: SIGNIFICANT CHANGE UP
-  VANCOMYCIN: SIGNIFICANT CHANGE UP
METHOD TYPE: SIGNIFICANT CHANGE UP
VANCOMYCIN TROUGH SERPL-MCNC: 18.1 UG/ML — SIGNIFICANT CHANGE UP (ref 10–20)

## 2019-03-21 PROCEDURE — 99232 SBSQ HOSP IP/OBS MODERATE 35: CPT

## 2019-03-21 RX ADMIN — Medication 650 MILLIGRAM(S): at 11:52

## 2019-03-21 RX ADMIN — Medication 650 MILLIGRAM(S): at 21:29

## 2019-03-21 RX ADMIN — Medication 650 MILLIGRAM(S): at 21:59

## 2019-03-21 RX ADMIN — Medication 166.67 MILLIGRAM(S): at 06:03

## 2019-03-21 RX ADMIN — ENOXAPARIN SODIUM 40 MILLIGRAM(S): 100 INJECTION SUBCUTANEOUS at 21:29

## 2019-03-21 RX ADMIN — Medication 166.67 MILLIGRAM(S): at 21:29

## 2019-03-21 RX ADMIN — Medication 1 DROP(S): at 21:29

## 2019-03-21 RX ADMIN — Medication 650 MILLIGRAM(S): at 07:32

## 2019-03-21 RX ADMIN — Medication 166.67 MILLIGRAM(S): at 13:35

## 2019-03-21 NOTE — PROGRESS NOTE ADULT - SUBJECTIVE AND OBJECTIVE BOX
Patient is a 54y old  Male who presents with a chief complaint of right knee pain (21 Mar 2019 13:27)      SUBJECTIVE / OVERNIGHT EVENTS:  doing better.  draining serous fluid    MEDICATIONS  (STANDING):  enoxaparin Injectable 40 milliGRAM(s) SubCutaneous daily  vancomycin  IVPB 1250 milliGRAM(s) IV Intermittent every 8 hours    MEDICATIONS  (PRN):  acetaminophen   Tablet .. 650 milliGRAM(s) Oral every 6 hours PRN Moderate Pain (4 - 6)      Vital Signs Last 24 Hrs  T(C): 36.2 (21 Mar 2019 11:47), Max: 37.3 (20 Mar 2019 21:03)  T(F): 97.2 (21 Mar 2019 11:47), Max: 99.1 (20 Mar 2019 21:03)  HR: 60 (21 Mar 2019 11:47) (60 - 72)  BP: 108/60 (21 Mar 2019 11:47) (108/60 - 127/79)  BP(mean): --  RR: 18 (21 Mar 2019 11:47) (18 - 18)  SpO2: 95% (21 Mar 2019 11:47) (95% - 98%)  CAPILLARY BLOOD GLUCOSE        I&O's Summary    20 Mar 2019 07:01  -  21 Mar 2019 07:00  --------------------------------------------------------  IN: 1590 mL / OUT: 500 mL / NET: 1090 mL    21 Mar 2019 07:01  -  21 Mar 2019 15:45  --------------------------------------------------------  IN: 970 mL / OUT: 0 mL / NET: 970 mL        PHYSICAL EXAM:  GENERAL: NAD, well-developed  HEAD:  Atraumatic, Normocephalic  EYES: EOMI, PERRLA, conjunctiva and sclera clear  NECK: Supple, No JVD  CHEST/LUNG: Clear to auscultation bilaterally; No wheeze  HEART: Regular rate and rhythm; No murmurs, rubs, or gallops  ABDOMEN: Soft, Nontender, Nondistended; Bowel sounds present  EXTREMITIES:  2+ Peripheral Pulses, No clubbing, cyanosis, or edema  PSYCH: AAOx3  NEUROLOGY: non-focal  SKIN: No rashes or lesions    LABS:                    RADIOLOGY & ADDITIONAL TESTS:    Imaging Personally Reviewed:    Consultant(s) Notes Reviewed:      Care Discussed with Consultants/Other Providers:

## 2019-03-21 NOTE — PROGRESS NOTE ADULT - SUBJECTIVE AND OBJECTIVE BOX
SCHUYLER YEE 54y [id number]    Patient is a 54y old  Male who presents with a chief complaint of right knee pain (20 Mar 2019 16:23)      Follow Up/CC:  ID following for bursitis    Interval History/ROS: no fever, feels better    Allergies    No Known Allergies    Intolerances        ANTIMICROBIALS:  vancomycin  IVPB 1250 every 8 hours      MEDICATIONS  (STANDING):  enoxaparin Injectable 40 milliGRAM(s) SubCutaneous daily  vancomycin  IVPB 1250 milliGRAM(s) IV Intermittent every 8 hours    MEDICATIONS  (PRN):  acetaminophen   Tablet .. 650 milliGRAM(s) Oral every 6 hours PRN Moderate Pain (4 - 6)        Vital Signs Last 24 Hrs  T(C): 36.2 (21 Mar 2019 11:47), Max: 37.3 (20 Mar 2019 21:03)  T(F): 97.2 (21 Mar 2019 11:47), Max: 99.1 (20 Mar 2019 21:03)  HR: 60 (21 Mar 2019 11:47) (60 - 72)  BP: 108/60 (21 Mar 2019 11:47) (108/60 - 127/79)  BP(mean): --  RR: 18 (21 Mar 2019 11:47) (18 - 18)  SpO2: 95% (21 Mar 2019 11:47) (95% - 98%)            MICROBIOLOGY:  .Abscess Knee - Right  03-19-19   Few Staphylococcus aureus  --  --      .Blood Blood-Venous  03-18-19   No growth to date.  --  --    Vancomycin Level, Trough: 18.1 ug/mL (03-21-19 @ 05:07)      RADIOLOGY    Xray Knee 4 Views, Right (03.18.19 @ 12:23) >  Anterior soft tissue swelling, consistent with prepatellar bursitis.

## 2019-03-22 ENCOUNTER — TRANSCRIPTION ENCOUNTER (OUTPATIENT)
Age: 54
End: 2019-03-22

## 2019-03-22 VITALS
DIASTOLIC BLOOD PRESSURE: 76 MMHG | HEART RATE: 62 BPM | TEMPERATURE: 99 F | OXYGEN SATURATION: 97 % | RESPIRATION RATE: 18 BRPM | SYSTOLIC BLOOD PRESSURE: 114 MMHG

## 2019-03-22 DIAGNOSIS — K59.00 CONSTIPATION, UNSPECIFIED: ICD-10-CM

## 2019-03-22 LAB
HCT VFR BLD CALC: 42.8 % — SIGNIFICANT CHANGE UP (ref 39–50)
HGB BLD-MCNC: 14.3 G/DL — SIGNIFICANT CHANGE UP (ref 13–17)
MCHC RBC-ENTMCNC: 30.5 PG — SIGNIFICANT CHANGE UP (ref 27–34)
MCHC RBC-ENTMCNC: 33.5 GM/DL — SIGNIFICANT CHANGE UP (ref 32–36)
MCV RBC AUTO: 91.1 FL — SIGNIFICANT CHANGE UP (ref 80–100)
PLATELET # BLD AUTO: 220 K/UL — SIGNIFICANT CHANGE UP (ref 150–400)
RBC # BLD: 4.69 M/UL — SIGNIFICANT CHANGE UP (ref 4.2–5.8)
RBC # FLD: 11.5 % — SIGNIFICANT CHANGE UP (ref 10.3–14.5)
VANCOMYCIN TROUGH SERPL-MCNC: 19.1 UG/ML — SIGNIFICANT CHANGE UP (ref 10–20)
WBC # BLD: 6.3 K/UL — SIGNIFICANT CHANGE UP (ref 3.8–10.5)
WBC # FLD AUTO: 6.3 K/UL — SIGNIFICANT CHANGE UP (ref 3.8–10.5)

## 2019-03-22 PROCEDURE — 82803 BLOOD GASES ANY COMBINATION: CPT

## 2019-03-22 PROCEDURE — 85014 HEMATOCRIT: CPT

## 2019-03-22 PROCEDURE — 73564 X-RAY EXAM KNEE 4 OR MORE: CPT

## 2019-03-22 PROCEDURE — 82435 ASSAY OF BLOOD CHLORIDE: CPT

## 2019-03-22 PROCEDURE — 86140 C-REACTIVE PROTEIN: CPT

## 2019-03-22 PROCEDURE — 80202 ASSAY OF VANCOMYCIN: CPT

## 2019-03-22 PROCEDURE — 87205 SMEAR GRAM STAIN: CPT

## 2019-03-22 PROCEDURE — 85027 COMPLETE CBC AUTOMATED: CPT

## 2019-03-22 PROCEDURE — 87040 BLOOD CULTURE FOR BACTERIA: CPT

## 2019-03-22 PROCEDURE — 87186 SC STD MICRODIL/AGAR DIL: CPT

## 2019-03-22 PROCEDURE — 85652 RBC SED RATE AUTOMATED: CPT

## 2019-03-22 PROCEDURE — 96375 TX/PRO/DX INJ NEW DRUG ADDON: CPT

## 2019-03-22 PROCEDURE — 84132 ASSAY OF SERUM POTASSIUM: CPT

## 2019-03-22 PROCEDURE — 96374 THER/PROPH/DIAG INJ IV PUSH: CPT

## 2019-03-22 PROCEDURE — 82330 ASSAY OF CALCIUM: CPT

## 2019-03-22 PROCEDURE — 99285 EMERGENCY DEPT VISIT HI MDM: CPT | Mod: 25

## 2019-03-22 PROCEDURE — 82947 ASSAY GLUCOSE BLOOD QUANT: CPT

## 2019-03-22 PROCEDURE — 84295 ASSAY OF SERUM SODIUM: CPT

## 2019-03-22 PROCEDURE — 87075 CULTR BACTERIA EXCEPT BLOOD: CPT

## 2019-03-22 PROCEDURE — 80053 COMPREHEN METABOLIC PANEL: CPT

## 2019-03-22 PROCEDURE — 73562 X-RAY EXAM OF KNEE 3: CPT

## 2019-03-22 PROCEDURE — 87070 CULTURE OTHR SPECIMN AEROBIC: CPT

## 2019-03-22 PROCEDURE — 99232 SBSQ HOSP IP/OBS MODERATE 35: CPT

## 2019-03-22 PROCEDURE — 83605 ASSAY OF LACTIC ACID: CPT

## 2019-03-22 RX ORDER — DOCUSATE SODIUM 100 MG
1 CAPSULE ORAL
Qty: 0 | Refills: 0 | COMMUNITY

## 2019-03-22 RX ORDER — ACETAMINOPHEN 500 MG
2 TABLET ORAL
Qty: 0 | Refills: 0 | COMMUNITY
Start: 2019-03-22

## 2019-03-22 RX ADMIN — Medication 166.67 MILLIGRAM(S): at 05:41

## 2019-03-22 RX ADMIN — Medication 166.67 MILLIGRAM(S): at 13:53

## 2019-03-22 RX ADMIN — Medication 1 DROP(S): at 05:41

## 2019-03-22 NOTE — PROGRESS NOTE ADULT - MS GEN HX ROS MEA POS PC
right knee pain
right knee pain better and draining fluid

## 2019-03-22 NOTE — PROGRESS NOTE ADULT - SUBJECTIVE AND OBJECTIVE BOX
Patient seen and examined at bedside. Reports no acute complaints at this time. Pain is well controlled. No acute events overnight.    PHYSICAL EXAM:  Vital Signs Last 24 Hrs  T(C): 36.7 (22 Mar 2019 05:33), Max: 36.9 (21 Mar 2019 20:02)  T(F): 98.1 (22 Mar 2019 05:33), Max: 98.5 (21 Mar 2019 20:02)  HR: 61 (22 Mar 2019 05:33) (60 - 71)  BP: 118/76 (22 Mar 2019 05:33) (108/60 - 138/77)  BP(mean): --  RR: 17 (22 Mar 2019 05:33) (17 - 18)  SpO2: 97% (22 Mar 2019 05:33) (95% - 99%)    Gen: NAD, AAOx3    Right Lower Extremity:  Dressing clean dry intact  +EHL/FHL/TA/GS  SILT L3-S1  +DP/PT Pulses  Compartments soft  No calf TTP B/L

## 2019-03-22 NOTE — PROGRESS NOTE ADULT - SUBJECTIVE AND OBJECTIVE BOX
SCHUYLER YEE 54y [id number]    Patient is a 54y old  Male who presents with a chief complaint of right knee pain (22 Mar 2019 13:06)      Follow Up/CC:  ID following for bursitis    Interval History/ROS: no fever, better    Allergies    No Known Allergies    Intolerances        ANTIMICROBIALS:  vancomycin  IVPB 1250 every 8 hours      MEDICATIONS  (STANDING):  artificial  tears Solution 1 Drop(s) Both EYES three times a day  enoxaparin Injectable 40 milliGRAM(s) SubCutaneous daily  vancomycin  IVPB 1250 milliGRAM(s) IV Intermittent every 8 hours    MEDICATIONS  (PRN):  acetaminophen   Tablet .. 650 milliGRAM(s) Oral every 6 hours PRN Moderate Pain (4 - 6)        Vital Signs Last 24 Hrs  T(C): 37.3 (22 Mar 2019 12:40), Max: 37.3 (22 Mar 2019 12:40)  T(F): 99.2 (22 Mar 2019 12:40), Max: 99.2 (22 Mar 2019 12:40)  HR: 62 (22 Mar 2019 12:40) (61 - 71)  BP: 114/76 (22 Mar 2019 12:40) (114/76 - 138/77)  BP(mean): --  RR: 18 (22 Mar 2019 12:40) (17 - 18)  SpO2: 97% (22 Mar 2019 12:40) (97% - 99%)    CBC Full  -  ( 22 Mar 2019 12:55 )  WBC Count : 6.3 K/uL  Hemoglobin : 14.3 g/dL  Hematocrit : 42.8 %  Platelet Count - Automated : 220 K/uL  Mean Cell Volume : 91.1 fl  Mean Cell Hemoglobin : 30.5 pg  Mean Cell Hemoglobin Concentration : 33.5 gm/dL  Auto Neutrophil # : x  Auto Lymphocyte # : x  Auto Monocyte # : x  Auto Eosinophil # : x  Auto Basophil # : x  Auto Neutrophil % : x  Auto Lymphocyte % : x  Auto Monocyte % : x  Auto Eosinophil % : x  Auto Basophil % : x                MICROBIOLOGY:  .Abscess Knee - Right  03-19-19   Few Methicillin resistant Staphylococcus aureus  --  Methicillin resistant Staphylococcus aureus      .Blood Blood-Venous  03-18-19   No growth to date.  --  --      Vancomycin Level, Trough: 19.1 ug/mL (03-22-19 @ 05:00)      RADIOLOGY    < from: Xray Knee 4 Views, Right (03.18.19 @ 12:23) >  Anterior soft tissue swelling, consistent with prepatellar bursitis.    < end of copied text >

## 2019-03-22 NOTE — DISCHARGE NOTE PROVIDER - NSDCCPCAREPLAN_GEN_ALL_CORE_FT
PRINCIPAL DISCHARGE DIAGNOSIS  Diagnosis: Septic bursitis  Assessment and Plan of Treatment: Follow up with your Orthopedic doctor in 1 week  Take all antibiotics as ordered.  Call you Health care provider upon arrival home to make a one week follow up appointment.  If you develop fever, chills, malaise, or change in mental status call your Health Care Provider or go to the Emergency Department.  Nutrition is important, eat small frequent meals to help ensure you get adequate calories.  Do not stay in bed all day!  Increase your activity daily as tolerated. PRINCIPAL DISCHARGE DIAGNOSIS  Diagnosis: Septic bursitis  Assessment and Plan of Treatment: Follow up with your Orthopedic doctor in 1 week  Follow up with Dr. Ghotra in 1 weej  Take all antibiotics as ordered.  Call you Health care provider upon arrival home to make a one week follow up appointment.  If you develop fever, chills, malaise, or change in mental status call your Health Care Provider or go to the Emergency Department.  Nutrition is important, eat small frequent meals to help ensure you get adequate calories.  Do not stay in bed all day!  Increase your activity daily as tolerated. PRINCIPAL DISCHARGE DIAGNOSIS  Diagnosis: Septic bursitis  Assessment and Plan of Treatment: Follow up with your Orthopedic doctor in 1 week  Follow up with Dr. Ghotra in 1 weej  Take all antibiotics as ordered.  Call you Health care provider upon arrival home to make a one week follow up appointment.  If you develop fever, chills, malaise, or change in mental status call your Health Care Provider or go to the Emergency Department.  Nutrition is important, eat small frequent meals to help ensure you get adequate calories.  Do not stay in bed all day!  Increase your activity daily as tolerated.      SECONDARY DISCHARGE DIAGNOSES  Diagnosis: Constipation  Assessment and Plan of Treatment: Take colace as needed  Monitor for any bleeding

## 2019-03-22 NOTE — PROGRESS NOTE ADULT - REASON FOR ADMISSION
right knee pain

## 2019-03-22 NOTE — PROGRESS NOTE ADULT - NEGATIVE ENMT SYMPTOMS
no ear pain/no nasal discharge/no throat pain
no nasal discharge/no throat pain/no ear pain
no nasal discharge/no throat pain/no ear pain
no throat pain/no nasal discharge/no ear pain

## 2019-03-22 NOTE — PROGRESS NOTE ADULT - NEUROLOGICAL DETAILS
responds to verbal commands/alert and oriented x 3/normal strength
responds to verbal commands/alert and oriented x 3/normal strength
alert and oriented x 3/responds to verbal commands/normal strength

## 2019-03-22 NOTE — PROGRESS NOTE ADULT - PROBLEM SELECTOR PLAN 1
-cont vancomycin  -increase vanco 1.25 gm iv q8
-cont vanco 1.25 gm iv q8  -plan 4 weeks abx - PO on DC  -better
-cont vancomycin  -dc with doxycyline 100 mg po bid x 3 weeks  -potential side effects of abx explained including GI issues, allergy issues, pill esophagitis and photosensitivity  -f/u in ID office in 3 weeks [phone]

## 2019-03-22 NOTE — PROGRESS NOTE ADULT - NEGATIVE CARDIOVASCULAR SYMPTOMS
no chest pain/no palpitations
no chest pain/no palpitations
no palpitations/no chest pain
no chest pain/no palpitations

## 2019-03-22 NOTE — PROGRESS NOTE ADULT - PROBLEM SELECTOR PLAN 3
-monitor creatinine  -monitor vanco trough - last one was low
-monitor creatinine
-monitor creatinine  -monitor vanco trough - last one was low

## 2019-03-22 NOTE — DISCHARGE NOTE PROVIDER - CARE PROVIDER_API CALL
Dr. Gaytan,   Orthopedic  Phone: (   )    -  Fax: (   )    -  Follow Up Time: Dr. Gaytan,   Orthopedic  Phone: (   )    -  Fax: (   )    -  Follow Up Time:     Rafat Ghotra (MD; MBBS)  Infectious Disease; Internal Medicine  [address] [place] [address]  Phone: [phone]  Fax: [phone]  Follow Up Time:

## 2019-03-22 NOTE — PROGRESS NOTE ADULT - NEGATIVE GASTROINTESTINAL SYMPTOMS
no diarrhea/no vomiting/no nausea/no abdominal pain
no vomiting/no abdominal pain/no nausea/no diarrhea
no vomiting/no diarrhea/no abdominal pain/no nausea
no vomiting/no diarrhea/no abdominal pain/no nausea

## 2019-03-22 NOTE — DISCHARGE NOTE PROVIDER - HOSPITAL COURSE
55 yo M no PMHx p/w R knee pain/swelling. Pt initially had a bursitis drained from the right knee in early December. It became infected and he was started on Keflex. The knee continued to be red and swollen and on 3/12 it was drained again and grew MRSA. He was started on Bactrim last Tuesday and today followed up with Dr. Gaytan (orthopedics) and was told to go to the ER. He has been ambulating but not been bearing much weight on the right leg. He has had some chills yesterday, no fevers, N/V, abd pain. Xray knee consistent with prepatellar bursitis. Ortho consulted-No acute ortho intervention at this time. ID consulted. Started Vancomycin IV for recurrent bursitis. Outpatient with Ortho. Pt to finishe 4 week total of abx. Outpatient f/u with PMD. 53 yo M no PMHx p/w R knee pain/swelling. Pt initially had a bursitis drained from the right knee in early December. It became infected and he was started on Keflex. The knee continued to be red and swollen and on 3/12 it was drained again and grew MRSA. He was started on Bactrim last Tuesday and today followed up with Dr. Gaytan (orthopedics) and was told to go to the ER. He has been ambulating but not been bearing much weight on the right leg. He has had some chills yesterday, no fevers, N/V, abd pain. Xray knee consistent with prepatellar bursitis. Ortho consulted-No acute ortho intervention at this time. ID consulted. Started Vancomycin IV for recurrent bursitis. Outpatient with Ortho. Pt to finishe 4 week total of abx. Outpatient f/u with PMD, ID and Ortho. 55 yo M no PMHx p/w R knee pain/swelling. Pt initially had a bursitis drained from the right knee in early December. It became infected and he was started on Keflex. The knee continued to be red and swollen and on 3/12 it was drained again and grew MRSA. He was started on Bactrim last Tuesday and today followed up with Dr. Gaytan (orthopedics) and was told to go to the ER. He has been ambulating but not been bearing much weight on the right leg. He has had some chills yesterday, no fevers, N/V, abd pain. Xray knee consistent with prepatellar bursitis. Ortho consulted-No acute ortho intervention at this time. ID consulted. Started Vancomycin IV for recurrent bursitis. Outpatient with Ortho. Pt to finish 4 week total of abx. Outpatient f/u with PMD, ID and Ortho.

## 2019-03-22 NOTE — PROGRESS NOTE ADULT - PROBLEM SELECTOR PLAN 2
-f/u wound cx  -appreciate ortho input  -plan po abx on DC
Stretcher
-appreciate ortho input  -plan po abx on DC
-f/u wound cx  -appreciate ortho input  -plan po abx on DC in am

## 2019-03-22 NOTE — PROGRESS NOTE ADULT - ASSESSMENT
53 yo M no PMHx p/w R knee pain/swelling. Pt initially had a bursitis drained from the right knee in early December. It became infected and he was started on Keflex. The knee continued to be red and swollen and on 3/12 it was drained again and grew MRSA. He was started on Bactrim last Tuesday and today followed up with Dr. Gaytan (orthopedics) and was told to go to the ER. He has been ambulating but not been bearing much weight on the right leg. He has had some chills yesterday, no fevers, N/V, abd pain. He had an MRI on 2/6.     R knee prepatellar bursitis with MRSA + R knee bursa cultures   Septic bursitis   -no surgery per ortho.  allo natural drainage  - MRSA infection  -cont vanco 1.25 gm iv q8  -plan 4 weeks abx - PO on DC  -wound cx sent off drainage.  - Pain control  - WBAT, PT/OT  - DVT PPX  - ESR/CRP  - ambulation encouraged
53 yo M no PMHx p/w R knee pain/swelling. Pt initially had a bursitis drained from the right knee in early December. It became infected and he was started on Keflex. The knee continued to be red and swollen and on 3/12 it was drained again and grew MRSA. He was started on Bactrim last Tuesday and today followed up with Dr. Gaytan (orthopedics) and was told to go to the ER. He has been ambulating but not been bearing much weight on the right leg. He has had some chills yesterday, no fevers, N/V, abd pain. He had an MRI on 2/6.     R knee prepatellar bursitis with MRSA + R knee bursa cultures   Septic bursitis   -ortho reeval given pus drainage- ?need for further I+D  - MRSA infection   -cont vanomcyin  -wound cx sent off drainage.  - Pain control  - WBAT, PT/OT  - DVT PPX  - ESR/CRP
55 yo M no PMHx p/w R knee pain/swelling. Pt initially had a bursitis drained from the right knee in early December. It became infected and he was started on Keflex. The knee continued to be red and swollen and on 3/12 it was drained again and grew MRSA. He was started on Bactrim last Tuesday and today followed up with Dr. Gaytan (orthopedics) and was told to go to the ER. He has been ambulating but not been bearing much weight on the right leg. He has had some chills yesterday, no fevers, N/V, abd pain. He had an MRI on 2/6.     R knee prepatellar bursitis with MRSA + R knee bursa cultures   Septic bursitis   -no surgery per ortho.  allo natural drainage  - MRSA infection   -cont vanomcyin  -wound cx sent off drainage.  - Pain control  - WBAT, PT/OT  - DVT PPX  - ESR/CRP
55 yo M no PMHx p/w R knee pain/swelling. Pt initially had a bursitis drained from the right knee in early December. It became infected and he was started on Keflex. The knee continued to be red and swollen and on 3/12 it was drained again and grew MRSA. He was started on Bactrim last Tuesday and today followed up with Dr. Gaytan (orthopedics) and was told to go to the ER. He has been ambulating but not been bearing much weight on the right leg. He has had some chills yesterday, no fevers, N/V, abd pain. He had an MRI on 2/6.     R knee prepatellar bursitis with MRSA + R knee bursa cultures   Septic bursitis   -no surgery per ortho.  allow natural drainage  - MRSA infection  -change to doxy 100 bid x 3 weeks per ID  -plan 4 weeks abx - PO on DC  -wound cx sent off drainage. +MRSA  - Pain control  - WBAT, PT/OT  - DVT PPX  - ambulation encouraged  - d/c home
A/P: 54yoM with R knee prepatellar bursitis with MRSA + R knee bursa cultures  Analgesia  DVT ppx  WBAT  PT/OT  Encourage incentive spirometry  FU BCx: Prelim Neg  No acute orthopedic surgical intervention   Ortho stable  Will continue to follow  Will discuss with attending and advise if plan changes
55 yo M no PMHx p/w R knee pain/swelling.  Found to have MRSA in the knee in the setting of known recurrent bursitis.
53 yo M no PMHx p/w R knee pain/swelling.  Found to have MRSA in the knee in the setting of known recurrent bursitis.

## 2019-03-22 NOTE — PROGRESS NOTE ADULT - SUBJECTIVE AND OBJECTIVE BOX
Patient is a 54y old  Male who presents with a chief complaint of right knee pain (22 Mar 2019 10:17)      SUBJECTIVE / OVERNIGHT EVENTS:  doing better    MEDICATIONS  (STANDING):  artificial  tears Solution 1 Drop(s) Both EYES three times a day  enoxaparin Injectable 40 milliGRAM(s) SubCutaneous daily  vancomycin  IVPB 1250 milliGRAM(s) IV Intermittent every 8 hours    MEDICATIONS  (PRN):  acetaminophen   Tablet .. 650 milliGRAM(s) Oral every 6 hours PRN Moderate Pain (4 - 6)      Vital Signs Last 24 Hrs  T(C): 37.3 (22 Mar 2019 12:40), Max: 37.3 (22 Mar 2019 12:40)  T(F): 99.2 (22 Mar 2019 12:40), Max: 99.2 (22 Mar 2019 12:40)  HR: 62 (22 Mar 2019 12:40) (61 - 71)  BP: 114/76 (22 Mar 2019 12:40) (114/76 - 138/77)  BP(mean): --  RR: 18 (22 Mar 2019 12:40) (17 - 18)  SpO2: 97% (22 Mar 2019 12:40) (97% - 99%)  CAPILLARY BLOOD GLUCOSE        I&O's Summary    21 Mar 2019 07:01  -  22 Mar 2019 07:00  --------------------------------------------------------  IN: 1210 mL / OUT: 0 mL / NET: 1210 mL    22 Mar 2019 07:01  -  22 Mar 2019 13:06  --------------------------------------------------------  IN: 350 mL / OUT: 0 mL / NET: 350 mL        PHYSICAL EXAM:  GENERAL: NAD, well-developed  HEAD:  Atraumatic, Normocephalic  EYES: EOMI, PERRLA, conjunctiva and sclera clear  NECK: Supple, No JVD  CHEST/LUNG: Clear to auscultation bilaterally; No wheeze  HEART: Regular rate and rhythm; No murmurs, rubs, or gallops  ABDOMEN: Soft, Nontender, Nondistended; Bowel sounds present  EXTREMITIES:  2+ Peripheral Pulses, No clubbing, cyanosis, or edema  PSYCH: AAOx3  NEUROLOGY: non-focal  SKIN: No rashes or lesions    LABS:                    RADIOLOGY & ADDITIONAL TESTS:    Imaging Personally Reviewed:    Consultant(s) Notes Reviewed:      Care Discussed with Consultants/Other Providers:

## 2019-03-22 NOTE — DISCHARGE NOTE NURSING/CASE MANAGEMENT/SOCIAL WORK - NSDCDPATPORTLINK_GEN_ALL_CORE
You can access the [facility] Portal, offered by [facility], by registering with the following website: [email or web address]

## 2019-03-22 NOTE — PROGRESS NOTE ADULT - NEGATIVE GENERAL GENITOURINARY SYMPTOMS
no flank pain L/no dysuria/no hematuria/no flank pain R
no flank pain R/no flank pain L/no hematuria/no dysuria
no hematuria/no flank pain L/no flank pain R/no dysuria
no flank pain L/no hematuria/no flank pain R/no dysuria

## 2019-03-22 NOTE — PROGRESS NOTE ADULT - RS GEN PE MLT RESP DETAILS PC
clear to auscultation bilaterally/respirations non-labored
clear to auscultation bilaterally/respirations non-labored
respirations non-labored/clear to auscultation bilaterally
respirations non-labored/clear to auscultation bilaterally/good air movement

## 2019-03-22 NOTE — DISCHARGE NOTE PROVIDER - CARE PROVIDERS DIRECT ADDRESSES
,DirectAddress_Unknown ,DirectAddress_Unknown,[email or web address]

## 2019-03-22 NOTE — PROGRESS NOTE ADULT - GASTROINTESTINAL DETAILS
nontender/no rigidity/no distention/no guarding/no rebound tenderness/soft
no guarding/nontender/no rebound tenderness/no distention/soft/no rigidity
no guarding/no distention/soft/no rebound tenderness/no rigidity/nontender
nontender/no distention/no guarding/no rebound tenderness/soft/no rigidity

## 2019-03-22 NOTE — CHART NOTE - NSCHARTNOTEFT_GEN_A_CORE
Pt c/o single event of small amount of blood after having a bm. States he has been constipated and was straining. Discussed with attending. Bleeding likely due to constipation. CBC was stable. Instructed pt to return to ED if any more bleeding. Pt medically cleared for discharge as d/w Dr. Arnold. Outpatient f.u with PMD, Ortho and ID.

## 2019-03-22 NOTE — PROGRESS NOTE ADULT - PROVIDER SPECIALTY LIST ADULT
Infectious Disease
Internal Medicine
Orthopedics
Infectious Disease

## 2019-03-22 NOTE — DISCHARGE NOTE PROVIDER - PROVIDER TOKENS
FREE:[LAST:[Dr. Gaytan],PHONE:[(   )    -],FAX:[(   )    -],ADDRESS:[Orthopedic]] FREE:[LAST:[Dr. Gaytan],PHONE:[(   )    -],FAX:[(   )    -],ADDRESS:[Orthopedic]],PROVIDER:[TOKEN:[[id number]]]

## 2019-03-22 NOTE — PROGRESS NOTE ADULT - ATTENDING COMMENTS
Rafat Ghotra  Attending Physician   Division of Infectious Disease  Pager #[phone]  After 5pm/weekend or no response, call #[phone]
Rafat Ghotra  Attending Physician   Division of Infectious Disease  Pager #[phone]  After 5pm/weekend or no response, call #[phone]
Rafat Ghotra  Attending Physician   Division of Infectious Disease  Pager #[phone]  After 5pm/weekend or no response, call #[phone]
Rafat Ghotra  Attending Physician   Division of Infectious Disease  Pager #[phone]  After 5pm/weekend or no response, call #[phone]

## 2019-03-23 LAB
CULTURE RESULTS: SIGNIFICANT CHANGE UP
CULTURE RESULTS: SIGNIFICANT CHANGE UP
SPECIMEN SOURCE: SIGNIFICANT CHANGE UP
SPECIMEN SOURCE: SIGNIFICANT CHANGE UP

## 2019-03-24 LAB
CULTURE RESULTS: SIGNIFICANT CHANGE UP
ORGANISM # SPEC MICROSCOPIC CNT: SIGNIFICANT CHANGE UP
ORGANISM # SPEC MICROSCOPIC CNT: SIGNIFICANT CHANGE UP
SPECIMEN SOURCE: SIGNIFICANT CHANGE UP

## 2019-03-27 PROBLEM — Z78.9 OTHER SPECIFIED HEALTH STATUS: Chronic | Status: ACTIVE | Noted: 2019-03-18

## 2019-03-30 NOTE — ED ADULT TRIAGE NOTE - ACCOMPANIED BY
Ventricular Rate : 64  Atrial Rate : 64  P-R Interval : 144  QRS Duration : 134  Q-T Interval : 424  QTC Calculation(Bezet) : 437  P Axis : 37  R Axis : -64  T Axis : -15  Diagnosis : Normal sinus rhythm  Left axis deviation  Left anterior fascicular block  Right bundle branch block  Abnormal ECG  When compared with ECG of 30-MAR-2019 09:30,  No significant change was found  Confirmed by Gt LEONG MD ([id number]) on 3/31/2019 6:53:30 PM   Spouse/Significant other

## 2019-05-02 ENCOUNTER — APPOINTMENT (OUTPATIENT)
Dept: INFECTIOUS DISEASE | Facility: CLINIC | Age: 54
End: 2019-05-02
Payer: COMMERCIAL

## 2019-05-02 VITALS
HEART RATE: 65 BPM | WEIGHT: 190 LBS | TEMPERATURE: 97.5 F | SYSTOLIC BLOOD PRESSURE: 109 MMHG | OXYGEN SATURATION: 97 % | DIASTOLIC BLOOD PRESSURE: 71 MMHG | BODY MASS INDEX: 26.5 KG/M2

## 2019-05-02 DIAGNOSIS — Z09 ENCOUNTER FOR FOLLOW-UP EXAMINATION AFTER COMPLETED TREATMENT FOR CONDITIONS OTHER THAN MALIGNANT NEOPLASM: ICD-10-CM

## 2019-05-02 DIAGNOSIS — Z22.322 CARRIER OR SUSPECTED CARRIER OF METHICILLIN RESISTANT STAPHYLOCOCCUS AUREUS: ICD-10-CM

## 2019-05-02 DIAGNOSIS — M70.51 OTHER BURSITIS OF KNEE, RIGHT KNEE: ICD-10-CM

## 2019-05-02 PROCEDURE — 99213 OFFICE O/P EST LOW 20 MIN: CPT

## 2019-05-02 RX ORDER — DOXYCYCLINE HYCLATE 100 MG/1
100 TABLET ORAL
Qty: 10 | Refills: 0 | Status: DISCONTINUED | COMMUNITY
Start: 2017-06-27 | End: 2019-05-02

## 2019-05-02 RX ORDER — CEFUROXIME AXETIL 250 MG/1
250 TABLET ORAL
Qty: 14 | Refills: 0 | Status: DISCONTINUED | COMMUNITY
Start: 2017-01-27 | End: 2019-05-02

## 2019-05-03 LAB
BASOPHILS # BLD AUTO: 0.04 K/UL
BASOPHILS NFR BLD AUTO: 0.9 %
EOSINOPHIL # BLD AUTO: 0.19 K/UL
EOSINOPHIL NFR BLD AUTO: 4.2 %
ERYTHROCYTE [SEDIMENTATION RATE] IN BLOOD BY WESTERGREN METHOD: 2 MM/HR
HCT VFR BLD CALC: 41.4 %
HGB BLD-MCNC: 13.6 G/DL
IMM GRANULOCYTES NFR BLD AUTO: 0 %
LYMPHOCYTES # BLD AUTO: 1.75 K/UL
LYMPHOCYTES NFR BLD AUTO: 39 %
MAN DIFF?: NORMAL
MCHC RBC-ENTMCNC: 29.8 PG
MCHC RBC-ENTMCNC: 32.9 GM/DL
MCV RBC AUTO: 90.6 FL
MONOCYTES # BLD AUTO: 0.32 K/UL
MONOCYTES NFR BLD AUTO: 7.1 %
NEUTROPHILS # BLD AUTO: 2.19 K/UL
NEUTROPHILS NFR BLD AUTO: 48.8 %
PLATELET # BLD AUTO: 169 K/UL
RBC # BLD: 4.57 M/UL
RBC # FLD: 13.1 %
WBC # FLD AUTO: 4.49 K/UL

## 2019-05-03 NOTE — ASSESSMENT
[FreeTextEntry1] : 53 y/o male with MRSA knee bursitis comes for followup visit. \par \par He is doing well. No complaints. \par \par No indication for abx. Knee looks good.\par \par Will check labs noted below. \par \par Told him to come back if fever, redness or swelling return.

## 2019-05-03 NOTE — PHYSICAL EXAM
[General Appearance - Alert] : alert [General Appearance - In No Acute Distress] : in no acute distress [Sclera] : the sclera and conjunctiva were normal [PERRL With Normal Accommodation] : pupils were equal in size, round, reactive to light [Outer Ear] : the ears and nose were normal in appearance [Extraocular Movements] : extraocular movements were intact [Neck Appearance] : the appearance of the neck was normal [Oropharynx] : the oropharynx was normal with no thrush [Neck Cervical Mass (___cm)] : no neck mass was observed [Thyroid Diffuse Enlargement] : the thyroid was not enlarged [Jugular Venous Distention Increased] : there was no jugular-venous distention [Heart Sounds] : normal S1 and S2 [Auscultation Breath Sounds / Voice Sounds] : lungs were clear to auscultation bilaterally [Full Pulse] : the pedal pulses are present [Edema] : there was no peripheral edema [Bowel Sounds] : normal bowel sounds [Abdomen Soft] : soft [Abdomen Tenderness] : non-tender [Abdomen Mass (___ Cm)] : no abdominal mass palpated [Costovertebral Angle Tenderness] : no CVA tenderness [] : no rash [Skin Color & Pigmentation] : normal skin color and pigmentation [FreeTextEntry1] : right knee much improved, good ROM, no swelling or redness, no drainage [Deep Tendon Reflexes (DTR)] : deep tendon reflexes were 2+ and symmetric [No Focal Deficits] : no focal deficits [Sensation] : the sensory exam was normal to light touch and pinprick [Affect] : the affect was normal [Oriented To Time, Place, And Person] : oriented to person, place, and time

## 2019-05-03 NOTE — HISTORY OF PRESENT ILLNESS
[FreeTextEntry1] : 53 y/o male comes for f/u visit for MRSA right knee bursitis.\par \par Completed 4 weeks abx. Complete 1 week iv abx in hospital and then 3 weeks po abx. Has been off abx x 2 weeks.\par \par Knee feels ok. No swelling. Has some chronic pain with knee in general.\par \par No N/V/D/abd pain.  No rash. No fever or chills. No CP/SOB. \par \par

## 2019-05-08 LAB
BACTERIA BLD CULT: NORMAL
BACTERIA BLD CULT: NORMAL

## 2019-07-14 NOTE — PATIENT PROFILE ADULT - DO YOU FEEL THREATENED BY OTHERS?
Discussed with patient at this time, per request of provider if patient preferred to have prescription of ABT changed or if patient would like to be admitted to the hospital. Patient and brother, who is caregiver. States that they prefer to change the medication and if is worse, would bring back to the ED. Updated provider at this time.      April Castro, SHAHNAZ  07/13/19 [id number]     no

## 2024-03-18 NOTE — ED ADULT NURSE NOTE - TEMPLATE LIST FOR HEAD TO TOE ASSESSMENT
03/20/24      David Bacon  [address] [facility] [address]    To whom it may concern:    This letter is a proof that patient was admitted and was treated at [facility].         Sincerely,         Marry Monzon RN  [facility]  [address] [address]  Phone: [phone]  Fax: [phone]            
{PICK LIST:[id number]}  
Orthopedic

## 2024-07-08 ENCOUNTER — APPOINTMENT (OUTPATIENT)
Dept: ORTHOPEDIC SURGERY | Facility: CLINIC | Age: 59
End: 2024-07-08

## 2024-07-08 VITALS — BODY MASS INDEX: 25.73 KG/M2 | WEIGHT: 190 LBS | HEIGHT: 72 IN

## 2024-07-08 DIAGNOSIS — S83.207A UNSPECIFIED TEAR OF UNSPECIFIED MENISCUS, CURRENT INJURY, LEFT KNEE, INITIAL ENCOUNTER: ICD-10-CM

## 2024-07-08 DIAGNOSIS — G25.81 RESTLESS LEGS SYNDROME: ICD-10-CM

## 2024-07-08 PROCEDURE — 99203 OFFICE O/P NEW LOW 30 MIN: CPT | Mod: 25

## 2024-07-08 PROCEDURE — 73564 X-RAY EXAM KNEE 4 OR MORE: CPT | Mod: LT

## 2024-07-08 RX ORDER — IBUPROFEN 800 MG/1
800 TABLET, FILM COATED ORAL 3 TIMES DAILY
Qty: 90 | Refills: 2 | Status: ACTIVE | COMMUNITY
Start: 2024-07-08 | End: 1900-01-01

## 2024-12-10 ENCOUNTER — APPOINTMENT (OUTPATIENT)
Facility: CLINIC | Age: 59
End: 2024-12-10
Payer: COMMERCIAL

## 2024-12-10 VITALS — HEIGHT: 72 IN | WEIGHT: 190 LBS | BODY MASS INDEX: 25.73 KG/M2

## 2024-12-10 DIAGNOSIS — S83.207A UNSPECIFIED TEAR OF UNSPECIFIED MENISCUS, CURRENT INJURY, LEFT KNEE, INITIAL ENCOUNTER: ICD-10-CM

## 2024-12-10 PROCEDURE — 99213 OFFICE O/P EST LOW 20 MIN: CPT

## 2024-12-10 RX ORDER — PREDNISONE 20 MG/1
20 TABLET ORAL DAILY
Qty: 10 | Refills: 0 | Status: ACTIVE | COMMUNITY
Start: 2024-12-10 | End: 1900-01-01

## 2024-12-18 ENCOUNTER — RESULT REVIEW (OUTPATIENT)
Age: 59
End: 2024-12-18

## 2024-12-20 ENCOUNTER — NON-APPOINTMENT (OUTPATIENT)
Age: 59
End: 2024-12-20

## 2025-01-08 ENCOUNTER — APPOINTMENT (OUTPATIENT)
Facility: CLINIC | Age: 60
End: 2025-01-08
Payer: COMMERCIAL

## 2025-01-08 VITALS — BODY MASS INDEX: 25.73 KG/M2 | WEIGHT: 190 LBS | HEIGHT: 72 IN

## 2025-01-08 DIAGNOSIS — S83.232D COMPLEX TEAR OF MEDIAL MENISCUS, CURRENT INJURY, LEFT KNEE, SUBSEQUENT ENCOUNTER: ICD-10-CM

## 2025-01-08 DIAGNOSIS — S83.232A COMPLEX TEAR OF MEDIAL MENISCUS, CURRENT INJURY, LEFT KNEE, INITIAL ENCOUNTER: ICD-10-CM

## 2025-01-08 PROCEDURE — 99214 OFFICE O/P EST MOD 30 MIN: CPT

## 2025-01-08 RX ORDER — PREDNISONE 10 MG/1
10 TABLET ORAL
Qty: 10 | Refills: 0 | Status: ACTIVE | COMMUNITY
Start: 2025-01-08 | End: 1900-01-01

## 2025-01-11 PROBLEM — S83.232D COMPLEX TEAR OF MEDIAL MENISCUS OF LEFT KNEE AS CURRENT INJURY, SUBSEQUENT ENCOUNTER: Status: ACTIVE | Noted: 2025-01-11

## 2025-01-11 PROBLEM — S83.232A COMPLEX TEAR OF MEDIAL MENISCUS OF LEFT KNEE AS CURRENT INJURY, INITIAL ENCOUNTER: Status: ACTIVE | Noted: 2025-01-11

## 2025-01-17 ENCOUNTER — APPOINTMENT (OUTPATIENT)
Facility: CLINIC | Age: 60
End: 2025-01-17

## 2025-01-17 DIAGNOSIS — S83.207A UNSPECIFIED TEAR OF UNSPECIFIED MENISCUS, CURRENT INJURY, LEFT KNEE, INITIAL ENCOUNTER: ICD-10-CM

## 2025-01-17 PROCEDURE — 20611 DRAIN/INJ JOINT/BURSA W/US: CPT | Mod: LT

## 2025-01-24 ENCOUNTER — APPOINTMENT (OUTPATIENT)
Facility: CLINIC | Age: 60
End: 2025-01-24

## 2025-01-24 PROCEDURE — 20611 DRAIN/INJ JOINT/BURSA W/US: CPT | Mod: LT

## 2025-01-31 ENCOUNTER — APPOINTMENT (OUTPATIENT)
Facility: CLINIC | Age: 60
End: 2025-01-31

## 2025-01-31 DIAGNOSIS — S83.232A COMPLEX TEAR OF MEDIAL MENISCUS, CURRENT INJURY, LEFT KNEE, INITIAL ENCOUNTER: ICD-10-CM

## 2025-01-31 PROCEDURE — 20611 DRAIN/INJ JOINT/BURSA W/US: CPT | Mod: LT

## 2025-02-13 NOTE — PATIENT PROFILE ADULT - [employer]
[facility] EMERGENCY ROOM  [address]  [place] [address]  Phone: [phone]  Fax: [phone]    Moy Granados was seen and treated in our emergency department on 2/13/2025.  He may return to work on 02/17/2025.         Thank you for choosing [facility].    Alondra Irwin APRN       (3) slightly limited